# Patient Record
Sex: MALE | URBAN - METROPOLITAN AREA
[De-identification: names, ages, dates, MRNs, and addresses within clinical notes are randomized per-mention and may not be internally consistent; named-entity substitution may affect disease eponyms.]

---

## 2022-03-08 ENCOUNTER — OFFICE VISIT (OUTPATIENT)
Dept: URBAN - METROPOLITAN AREA CLINIC 11 | Facility: CLINIC | Age: 67
End: 2022-03-08
Payer: MEDICARE

## 2022-03-08 DIAGNOSIS — H04.123 DRY EYE SYNDROME OF BILATERAL LACRIMAL GLANDS: ICD-10-CM

## 2022-03-08 DIAGNOSIS — H43.813 VITREOUS DEGENERATION, BILATERAL: Primary | ICD-10-CM

## 2022-03-08 DIAGNOSIS — H40.053 OCULAR HYPERTENSION, BILATERAL: ICD-10-CM

## 2022-03-08 PROCEDURE — 92004 COMPRE OPH EXAM NEW PT 1/>: CPT | Performed by: OPTOMETRIST

## 2022-03-08 PROCEDURE — 92133 CPTRZD OPH DX IMG PST SGM ON: CPT | Performed by: OPTOMETRIST

## 2022-03-08 ASSESSMENT — INTRAOCULAR PRESSURE
OS: 20
OD: 20

## 2022-03-08 ASSESSMENT — KERATOMETRY
OS: 44.00
OD: 44.88

## 2023-04-03 NOTE — IMPRESSION/PLAN
Impression: Ocular hypertension, bilateral: H40.053. Plan: Patient advised of stable condition and IOP stable without medication. RNFL performed and evaluated today - Results within normal limits OU. Will continue to monitor intraocular pressure. No treatment is being implemented at this time. 
RTC: 1 year complete exam with Optos, glare, and RNFL

## 2023-04-03 NOTE — IMPRESSION/PLAN
Impression: Age-related nuclear cataract, bilateral: H25.13. Plan: Cataracts noted on exam today which account for patient's complaints. No treatment currently recommended. Discussed the cataract procedure process in depth with patient. The patient will monitor vision changes and contact us with any decrease in vision. Patient advised that his decreased night vision quality is due to his progressing cataracts.